# Patient Record
Sex: FEMALE | Race: WHITE | NOT HISPANIC OR LATINO | Employment: FULL TIME | URBAN - METROPOLITAN AREA
[De-identification: names, ages, dates, MRNs, and addresses within clinical notes are randomized per-mention and may not be internally consistent; named-entity substitution may affect disease eponyms.]

---

## 2023-05-24 ENCOUNTER — E-VISIT (OUTPATIENT)
Dept: PRIMARY CARE | Facility: CLINIC | Age: 28
End: 2023-05-24
Payer: COMMERCIAL

## 2023-05-24 DIAGNOSIS — J34.9 SINUS PROBLEM: Primary | ICD-10-CM

## 2023-05-24 PROCEDURE — 99202 OFFICE O/P NEW SF 15 MIN: CPT | Performed by: FAMILY MEDICINE

## 2023-05-24 ASSESSMENT — LIFESTYLE VARIABLES: HISTORY_OF_SMOKING: I HAVE NEVER SMOKED

## 2023-05-24 NOTE — TELEPHONE ENCOUNTER
Angeles,    Thank you for reaching out to me.  I would recommend nasal irrigation, take OTC nasal steroid spray ( Flonase, nasonex etc.,) to use after nasal irrigation.  Take Zytec 10 mg daily until symptoms completely gone.  Drink adequate amount of water as antihistamines give dry mouth..

## 2023-11-14 NOTE — PROGRESS NOTES
Follow up visit    Patient reports significant improvement on symptoms since she has been on CPAP.  She shows compliance report with very low mask leak good compliance and overall resolution of events when using PAP. Compliance score is of 98.  Only complaint at this time is sporadic snoring with the mask that is due to reflux from nasal flow.  Patient also reports that she might lay on tubing depending on which side she is laying.    DME order is placed for chinstrap.  Patient instructed to continue CPAP usage.  Long-term follow-up scheduled.     Provider Impressions     29 yo F with mild MIR (AHI 5.8) on home sleep study (3/2023) who presents with concerns regarding fatigue and sleep quality. She is a good candidate for CPAP initiation for which we will order a nasal pillow as the nasal cavities are largely patent today. She does have clear rhinorrhea which should resolve since she has an acute URI. Otherwise, will plan to follow up in a few months to discuss sleep and additional weight management strategies as her weight may be playing a role in her fatigue.      Chief Complaint     New patient visit for sleep apnea      History of Present IllnessMs. MARLI SCHILLING May 14 1995, referred for an initial consultation of sleep apnea. She has had issues in the past with feeling constantly tired and having the sensation she always needs to take a nap. She was evaluated by another provider but had issues with the office communication and chose to follow up with someone else - us. She is able to read books and drive while being alert. She has been using a friends CPAP machine for the last week, but feels the setting is too high. [facility] Sleepiness Scale Score is 0 /24. Fatigue Severity Scale Score is 45 /63. Snoring VAS 10 /10     Sleep study histories: (personally reviewed raw data such as interpretation report, data sheet, hypnogram, and titration table)  The patient has been previously diagnosed with obstructive sleep  apnea (MIR), in a sleep test performed on 3/18/23, that showed an Apnea Hypopnea Index (AHI) of 5.8 events per hour. The patient spent 0.2% of total sleep time with 02 sat less than 90%. During supine sleep the AHI was 8.1 events per hour.      Patient does reports nasal obstruction currently because she has a cold, but normally feels her breathing is good through the nose. She is on flonase daily.   NOSE- 7 / 20  Nasal Obstruction VAS-0 /10                 Active Problems     · Acute bronchitis with bronchospasm (466.0) (J20.9)   · Acute upper respiratory infection (465.9) (J06.9)   · Vertigo (780.4) (R42)     Social History     · Non-smoker (V49.89) (Z78.9)     Allergies     · No Known Drug Allergies   Recorded By: Adrianne Renae; 8/10/2016 11:20:22 AM     Current Meds     Medication Name Instruction   Topiramate 100 MG Oral Tablet        Vitals  Vital Signs     Recorded: [id number] 11:32AM   Temperature 98.1 F   Height 5 ft 5 in   Weight 274 lb    BMI Calculated 45.6 kg/m2   BSA Calculated 2.26   Tobacco Use b) No   PHQ-2  #1. Over the last 2 weeks have you felt down, depressed or hopeless?  (If yes, answer PHQ-9 below) No   PHQ-2  #2. Over the last 2 weeks have you felt little interest  or pleasure in doing things?  (If yes, answer PHQ-9 below) No   Falls Screening (Age 18+) a) No falls within the last year      Physical Exam     Constitutional   General appearance: Healthy-appearing, well-nourished, well groomed, in no acute distress.~   Ability to communicate: Normal communication without aids, normal voice quality.   Head and Face   Head and face: Atraumatic with no masses, lesions, or scarring.   Eyes   Pupils and irises: EOM intact, PERRLA, conjunctiva non-injected.   Ears   External inspection of ears: Ears normally formed and free of lesions. Dorsum symmetric with no visible or palpable deformities.   Nose   External inspection of nose: No nasal lesions, lacerations, or scars. Nasal tip symmetrical with  normal nasal valves.    Throat Tonsils 2-3+.   Neck   Neck: Symmetrical, trachea midline.   Lymphatic   Palpation of cervical lymph nodes: No palpable lymph node enlargement, no submandibular adenopathy, no anterior cervical adenopathy, no supraclavicular adenopathy.   Neurological/Psychiatric   Mood and affect: Normal.       Procedure  Procedure Note: Flexible Nasolaryngoscopy  Verbal informed consent was obtained from the patient/patient's guardian. 4% lidocaine mixed with phenylephrine was prepared and dripped into the nose. It was placed in the lrft naris. Following an appropriate amount of time to allow for adequate anesthesia, a flexible fiberoptic nasolaryngoscope was placed into the patient's lrft naris. The nasal cavity, nasopharynx, oropharynx, hypopharynx, and all endolaryngeal structures were visualized and were normal except as listed below. Significant findings included:  -clear rhinorrhea and post-nasal drip  -normal vocal fold motion, b/l  -lateral wall collapse upon plugging nostrils and inspiration  -lingual tonsil hypertrophy but not significantly obstruction  -post-cricoid edema

## 2023-11-15 ENCOUNTER — OFFICE VISIT (OUTPATIENT)
Dept: OTOLARYNGOLOGY | Facility: CLINIC | Age: 28
End: 2023-11-15
Payer: COMMERCIAL

## 2023-11-15 VITALS — TEMPERATURE: 98.3 F | HEIGHT: 65 IN | BODY MASS INDEX: 46.65 KG/M2 | WEIGHT: 280 LBS

## 2023-11-15 DIAGNOSIS — G47.33 OSA (OBSTRUCTIVE SLEEP APNEA): Primary | ICD-10-CM

## 2023-11-15 PROCEDURE — 99214 OFFICE O/P EST MOD 30 MIN: CPT

## 2023-11-15 PROCEDURE — 1036F TOBACCO NON-USER: CPT

## 2023-11-15 RX ORDER — TOPIRAMATE 100 MG/1
100 TABLET, FILM COATED ORAL DAILY
COMMUNITY
Start: 2023-05-24

## 2023-11-15 RX ORDER — BUTALBITAL, ACETAMINOPHEN AND CAFFEINE 50; 325; 40 MG/1; MG/1; MG/1
1 TABLET ORAL EVERY 4 HOURS PRN
COMMUNITY
Start: 2023-06-28

## 2023-11-15 RX ORDER — SUMATRIPTAN SUCCINATE 100 MG/1
TABLET ORAL AS NEEDED
COMMUNITY
Start: 2023-06-28

## 2023-11-15 RX ORDER — CITALOPRAM 40 MG/1
40 TABLET, FILM COATED ORAL DAILY
COMMUNITY
Start: 2023-05-24

## 2023-11-15 ASSESSMENT — PATIENT HEALTH QUESTIONNAIRE - PHQ9
1. LITTLE INTEREST OR PLEASURE IN DOING THINGS: NOT AT ALL
SUM OF ALL RESPONSES TO PHQ9 QUESTIONS 1 & 2: 0
2. FEELING DOWN, DEPRESSED OR HOPELESS: NOT AT ALL

## 2024-06-24 ENCOUNTER — OFFICE VISIT (OUTPATIENT)
Dept: PRIMARY CARE | Facility: CLINIC | Age: 29
End: 2024-06-24
Payer: COMMERCIAL

## 2024-06-24 VITALS
HEART RATE: 71 BPM | DIASTOLIC BLOOD PRESSURE: 78 MMHG | WEIGHT: 281 LBS | BODY MASS INDEX: 46.82 KG/M2 | SYSTOLIC BLOOD PRESSURE: 128 MMHG | OXYGEN SATURATION: 99 % | HEIGHT: 65 IN

## 2024-06-24 DIAGNOSIS — Z76.89 ENCOUNTER TO ESTABLISH CARE: Primary | ICD-10-CM

## 2024-06-24 DIAGNOSIS — E66.01 OBESITY, CLASS III, BMI 40-49.9 (MORBID OBESITY) (MULTI): ICD-10-CM

## 2024-06-24 DIAGNOSIS — K21.9 GASTROESOPHAGEAL REFLUX DISEASE, UNSPECIFIED WHETHER ESOPHAGITIS PRESENT: ICD-10-CM

## 2024-06-24 PROCEDURE — 1036F TOBACCO NON-USER: CPT

## 2024-06-24 PROCEDURE — 99203 OFFICE O/P NEW LOW 30 MIN: CPT

## 2024-06-24 ASSESSMENT — PATIENT HEALTH QUESTIONNAIRE - PHQ9
1. LITTLE INTEREST OR PLEASURE IN DOING THINGS: NOT AT ALL
2. FEELING DOWN, DEPRESSED OR HOPELESS: NOT AT ALL
SUM OF ALL RESPONSES TO PHQ9 QUESTIONS 1 AND 2: 0

## 2024-06-24 ASSESSMENT — ENCOUNTER SYMPTOMS
BLOOD IN STOOL: 0
ABDOMINAL PAIN: 1
NAUSEA: 1
VOMITING: 1
CHILLS: 0
FEVER: 0
DIARRHEA: 0
CONSTIPATION: 0
APPETITE CHANGE: 1

## 2024-06-24 ASSESSMENT — PAIN SCALES - GENERAL: PAINLEVEL: 0-NO PAIN

## 2024-06-24 NOTE — PROGRESS NOTES
"Subjective   Patient ID: Angeles Arcos is a 29 y.o. female who presents for Establish Care (Discuss abdominal pain for a long time, wants new advice/Discuss weight loss /la/).    Hx JOHN, migraines without auro    Other providers: Dr. Lanny Leary (neurologist, rx Imitrex, Topamax, Fioricet), [facility]    JOHN: currently controlled on 40 mg Celexa. Has been on Celexa since 2020 after boyfriend passed away (was chosen since mother and grandmother had good response too). Denies depression, SI.     Abdominal pain on/off x 15 years. Patient states previous PCP want her to have EDG but at the time she was teenager and was worried about. Patient states ever time she eats gest nausea, takes OTC Nexium every day for bad GERD. Patient states tosses and turns due to nausea at night. Patient was previously on Omeprazole. Appetite low due to nausea, but then skips meal and worsens nausea and comes with light-headiness, shakiness.    Obesity: patient weight fluctuated. No matter how hard she diets and exercises, always hits a plateau and unable to lose and weight. Plateau around 240-260 lbs. Patient has tried fasting, the [facility] diet, keto, [facility] weight loss program. Has had multiple labs done, and always normal. Interested in weight loss medications, will call her insurance.        Review of Systems   Constitutional:  Positive for appetite change. Negative for chills and fever.   Gastrointestinal:  Positive for abdominal pain, nausea and vomiting. Negative for blood in stool, constipation and diarrhea.       Objective   /78   Pulse 71   Ht 1.651 m (5' 5\")   Wt 127 kg (281 lb)   SpO2 99%   BMI 46.76 kg/m²     Physical Exam  Constitutional:       Appearance: Normal appearance.   Cardiovascular:      Rate and Rhythm: Normal rate and regular rhythm.      Heart sounds: No murmur heard.  Pulmonary:      Effort: Pulmonary effort is normal.      Breath sounds: Normal breath sounds. No wheezing, rhonchi or rales. "   Abdominal:      General: Abdomen is flat. Bowel sounds are normal.      Palpations: Abdomen is soft. There is no hepatomegaly, splenomegaly or mass.      Tenderness: There is no abdominal tenderness. There is no guarding or rebound.   Skin:     General: Skin is warm and dry.      Findings: No rash.   Neurological:      Mental Status: She is alert.   Psychiatric:         Mood and Affect: Mood and affect normal.         Assessment/Plan   Diagnoses and all orders for this visit:  Encounter to establish care  Gastroesophageal reflux disease, unspecified whether esophagitis present  -     Referral to Gastroenterology; Future  Obesity, Class III, BMI 40-49.9 (morbid obesity) (Multi)  -Interested in weight loss medications, will call her insurance.

## 2024-09-12 ENCOUNTER — HOSPITAL ENCOUNTER (OUTPATIENT)
Dept: RADIOLOGY | Facility: CLINIC | Age: 29
Discharge: HOME | End: 2024-09-12
Payer: COMMERCIAL

## 2024-09-12 DIAGNOSIS — R19.4 CHANGE IN BOWEL HABIT: ICD-10-CM

## 2024-09-12 PROCEDURE — 76705 ECHO EXAM OF ABDOMEN: CPT

## 2024-09-13 ENCOUNTER — TELEPHONE (OUTPATIENT)
Dept: PRIMARY CARE | Facility: CLINIC | Age: 29
End: 2024-09-13
Payer: COMMERCIAL

## 2024-09-13 NOTE — TELEPHONE ENCOUNTER
Pt had RUQ that looks like it was ordered by GI so I'm not sure why it got sent to [facility], but it basically just shows fatty liver disease and she should discuss the results w/her GI provider

## 2024-10-14 ENCOUNTER — TELEPHONE (OUTPATIENT)
Dept: OPHTHALMOLOGY | Facility: CLINIC | Age: 29
End: 2024-10-14
Payer: COMMERCIAL

## 2024-10-23 ENCOUNTER — OFFICE VISIT (OUTPATIENT)
Dept: OPHTHALMOLOGY | Facility: CLINIC | Age: 29
End: 2024-10-23
Payer: COMMERCIAL

## 2024-10-23 DIAGNOSIS — H52.7 REFRACTION ERROR: ICD-10-CM

## 2024-10-23 DIAGNOSIS — G43.109 MIGRAINE WITH AURA AND WITHOUT STATUS MIGRAINOSUS, NOT INTRACTABLE: Primary | ICD-10-CM

## 2024-10-23 PROCEDURE — 99203 OFFICE O/P NEW LOW 30 MIN: CPT | Performed by: OPHTHALMOLOGY

## 2024-10-23 PROCEDURE — 99213 OFFICE O/P EST LOW 20 MIN: CPT | Performed by: OPHTHALMOLOGY

## 2024-10-23 RX ORDER — SODIUM FLUORIDE 6 MG/ML
PASTE, DENTIFRICE DENTAL
COMMUNITY
Start: 2024-01-02

## 2024-10-23 RX ORDER — OMEPRAZOLE 40 MG/1
CAPSULE, DELAYED RELEASE ORAL EVERY 24 HOURS
COMMUNITY
Start: 2024-07-10

## 2024-10-23 ASSESSMENT — VISUAL ACUITY
OS_CC: 20/25
OS_CC+: -1
METHOD: SNELLEN - LINEAR
CORRECTION_TYPE: CONTACTS
OD_CC: 20/20

## 2024-10-23 ASSESSMENT — ENCOUNTER SYMPTOMS
GASTROINTESTINAL NEGATIVE: 0
PSYCHIATRIC NEGATIVE: 0
MUSCULOSKELETAL NEGATIVE: 0
NEUROLOGICAL NEGATIVE: 1
ENDOCRINE NEGATIVE: 0
ALLERGIC/IMMUNOLOGIC NEGATIVE: 0
RESPIRATORY NEGATIVE: 0
CARDIOVASCULAR NEGATIVE: 0
EYES NEGATIVE: 0
HEMATOLOGIC/LYMPHATIC NEGATIVE: 0
CONSTITUTIONAL NEGATIVE: 0

## 2024-10-23 ASSESSMENT — EXTERNAL EXAM - RIGHT EYE: OD_EXAM: NORMAL

## 2024-10-23 ASSESSMENT — CUP TO DISC RATIO
OD_RATIO: 0.25
OS_RATIO: 0.25

## 2024-10-23 ASSESSMENT — PATIENT HEALTH QUESTIONNAIRE - PHQ9
SUM OF ALL RESPONSES TO PHQ9 QUESTIONS 1 AND 2: 0
2. FEELING DOWN, DEPRESSED OR HOPELESS: NOT AT ALL
1. LITTLE INTEREST OR PLEASURE IN DOING THINGS: NOT AT ALL

## 2024-10-23 ASSESSMENT — TONOMETRY
OS_IOP_MMHG: 13
IOP_METHOD: GOLDMANN APPLANATION
OD_IOP_MMHG: 13

## 2024-10-23 ASSESSMENT — EXTERNAL EXAM - LEFT EYE: OS_EXAM: NORMAL

## 2024-10-23 ASSESSMENT — SLIT LAMP EXAM - LIDS
COMMENTS: NORMAL
COMMENTS: NORMAL

## 2024-10-23 ASSESSMENT — PAIN SCALES - GENERAL: PAINLEVEL_OUTOF10: 0-NO PAIN

## 2024-10-23 NOTE — ASSESSMENT & PLAN NOTE
No signs of papilledema or ocular pathology on exam. Advised to continue workup per neurology plan.

## 2024-10-23 NOTE — LETTER
"October 23, 2024    Barbara Moreno, APRN-CNP  [address]  [address]  [facility] [address]    Patient: Angeles Arcos   YOB: 1995   Date of Visit: 10/23/2024       Dear Dr. Barbara Moreno, APRN-CNP:    Thank you for referring Angeles Arcos to me for evaluation. Here is my assessment and plan of care:    Assessment/Plan:  Angeles was seen today for migraine.  Diagnoses and all orders for this visit:  Migraine with aura and without status migrainosus, not intractable (Primary)  Refraction error    No signs of papilledema or ocular pathology resulting in HA noted on exam.     Below you can find relevant pieces of the exam. If you have questions, please do not hesitate to call me. I look forward to following Angeles along with you.         Sincerely,        Chirag Rehman MD        CC:   No Recipients         External Exam         Right Left    External Normal Normal              Slit Lamp Exam         Right Left    Lids/Lashes Normal Normal    Conjunctiva/Sclera White and quiet White and quiet    Cornea trace pannus 360 trace pannus 360    Anterior Chamber Deep and quiet Deep and quiet    Iris Round and reactive Round and reactive    Lens Clear Clear              Fundus Exam         Right Left    Vitreous Normal Normal    Disc Normal Normal    C/D Ratio 0.25 0.25    Macula Normal Normal    Vessels Normal Normal    Periphery Normal Normal                   <div id=\"MAIN_[email or web address]\"></div>     "

## 2024-10-23 NOTE — PATIENT INSTRUCTIONS
Thank you so much for choosing me to provide your care today!    If you were dilated your vision may remain blurry   or light sensitive for several hours.    The nature of eye and vision problems can require frequent follow up, please make every effort to adhere to any future appointments.    If you have any issues, questions, or concerns,   please do not hesitate to reach out.    If you receive a survey in regards to your care today, please mention any exceptional care my office staff and/or technicians provided.    You can reach our office at this number:    [phone]    Please consider signing up for and utilizing [employer]!  This is the best way to directly reach me or other  providers

## 2024-10-23 NOTE — PROGRESS NOTES
Assessment/Plan   Problem List Items Addressed This Visit       Migraine with aura and without status migrainosus, not intractable - Primary     No signs of papilledema or ocular pathology on exam. Advised to continue workup per neurology plan.          Refraction error     Advised on best soft contact lens (SCL) hygiene, has optometric care in place for vision.             Provided reassurance regarding above diagnoses and care received in the office visit today. Discussed outcomes and options along with the importance of treatment compliance. Understands the importance of any follow up visits. Patient instructed to call/communicate with our office if any new issues, questions, or concerns.     Will plan to see back PRN

## 2024-10-30 ENCOUNTER — APPOINTMENT (OUTPATIENT)
Dept: RADIOLOGY | Facility: HOSPITAL | Age: 29
End: 2024-10-30
Payer: COMMERCIAL

## 2024-10-30 ENCOUNTER — HOSPITAL ENCOUNTER (OUTPATIENT)
Dept: RADIOLOGY | Facility: HOSPITAL | Age: 29
Discharge: HOME | End: 2024-10-30
Payer: COMMERCIAL

## 2024-10-30 DIAGNOSIS — R51.9 HEADACHE, UNSPECIFIED: ICD-10-CM

## 2024-10-30 DIAGNOSIS — G43.119 MIGRAINE WITH AURA, INTRACTABLE, WITHOUT STATUS MIGRAINOSUS: ICD-10-CM

## 2024-10-30 DIAGNOSIS — R42 DIZZINESS AND GIDDINESS: ICD-10-CM

## 2024-10-30 PROCEDURE — 70547 MR ANGIOGRAPHY NECK W/O DYE: CPT | Performed by: RADIOLOGY

## 2024-10-30 PROCEDURE — 70544 MR ANGIOGRAPHY HEAD W/O DYE: CPT

## 2024-10-30 PROCEDURE — 70551 MRI BRAIN STEM W/O DYE: CPT | Performed by: RADIOLOGY

## 2024-10-30 PROCEDURE — 70547 MR ANGIOGRAPHY NECK W/O DYE: CPT

## 2024-10-30 PROCEDURE — 70551 MRI BRAIN STEM W/O DYE: CPT

## 2024-11-19 DIAGNOSIS — K76.0 FATTY INFILTRATION OF LIVER: Primary | ICD-10-CM
